# Patient Record
(demographics unavailable — no encounter records)

---

## 2024-10-10 NOTE — PLAN
[FreeTextEntry1] : annual-gc/ch refill ocp no contraindications hurricaine to hold onto f/u 1 yr prn

## 2024-10-10 NOTE — HISTORY OF PRESENT ILLNESS
[FreeTextEntry1] : 18 yo g0  no med/surg hx meds-ocp no additional here for annual and refill ocp extends on pill first night post mikvah feels some vaginal pain post sex , sex after that is comfortable

## 2025-03-11 NOTE — PHYSICAL EXAM
[Chaperone Present] : A chaperone was present in the examining room during all aspects of the physical examination [Soft] : soft [Non-tender] : non-tender [Non-distended] : non-distended [No Mass] : no mass [Oriented x3] : oriented x3 [Labia Majora] : normal [Labia Minora] : normal [Normal] : normal [Enlarged ___ wks] : enlarged [unfilled] ~Uweeks [Uterine Adnexae] : normal

## 2025-03-11 NOTE — HISTORY OF PRESENT ILLNESS
[FreeTextEntry1] : pt was having reg menses, Q28 days, now has positive pregnancy test LMP 01/12/25 ;no bleeding since then on no meds except folic acid has sig nausea, has not appetite for chicken can manage bread and peanut butter she is very tired

## 2025-03-11 NOTE — PLAN
[FreeTextEntry1] : earl today is c/w LMP  EDC 10/19/25 prenatal bloods drawn offered NIPs, pt to think about it discussed foods to try small meals alternate protein choices continue with folic acid only until feeling better n/v 4 wks

## 2025-03-11 NOTE — PROCEDURE
[Transvaginal OB Sonogram] : Transvaginal OB Sonogram [Intrauterine Pregnancy] : intrauterine pregnancy [Yolk Sac] : yolk sac present [Fetal Heart] : fetal heart present [CRL: ___ (mm)] : CRL - [unfilled]Umm [Current GA by Sonogram: ___ (wks)] : Current GA by Sonogram: [unfilled]Uwks [FreeTextEntry1] :  bpm